# Patient Record
Sex: MALE | Race: WHITE | NOT HISPANIC OR LATINO | Employment: FULL TIME | URBAN - METROPOLITAN AREA
[De-identification: names, ages, dates, MRNs, and addresses within clinical notes are randomized per-mention and may not be internally consistent; named-entity substitution may affect disease eponyms.]

---

## 2022-06-24 ENCOUNTER — OFFICE VISIT (OUTPATIENT)
Dept: FAMILY MEDICINE | Facility: CLINIC | Age: 37
End: 2022-06-24
Payer: COMMERCIAL

## 2022-06-24 VITALS
SYSTOLIC BLOOD PRESSURE: 142 MMHG | DIASTOLIC BLOOD PRESSURE: 90 MMHG | HEART RATE: 92 BPM | TEMPERATURE: 98.1 F | OXYGEN SATURATION: 98 %

## 2022-06-24 DIAGNOSIS — J02.9 ACUTE SORE THROAT: Primary | ICD-10-CM

## 2022-06-24 DIAGNOSIS — J03.90 ACUTE TONSILLITIS, UNSPECIFIED ETIOLOGY: ICD-10-CM

## 2022-06-24 LAB
DEPRECATED S PYO AG THROAT QL EIA: NEGATIVE
GROUP A STREP BY PCR: NOT DETECTED
MONOCYTES NFR BLD AUTO: NEGATIVE %

## 2022-06-24 PROCEDURE — 86308 HETEROPHILE ANTIBODY SCREEN: CPT

## 2022-06-24 PROCEDURE — 87651 STREP A DNA AMP PROBE: CPT | Performed by: PHYSICIAN ASSISTANT

## 2022-06-24 PROCEDURE — 99203 OFFICE O/P NEW LOW 30 MIN: CPT

## 2022-06-24 PROCEDURE — 36415 COLL VENOUS BLD VENIPUNCTURE: CPT

## 2022-06-24 RX ORDER — LISINOPRIL 5 MG/1
5 TABLET ORAL DAILY
COMMUNITY
Start: 2022-04-24

## 2022-06-24 RX ORDER — AMOXICILLIN 875 MG
875 TABLET ORAL 2 TIMES DAILY
Qty: 20 TABLET | Refills: 0 | Status: SHIPPED | OUTPATIENT
Start: 2022-06-24 | End: 2022-07-04

## 2022-06-24 RX ORDER — INDOMETHACIN 50 MG/1
CAPSULE ORAL
COMMUNITY
Start: 2021-12-28

## 2022-06-24 RX ORDER — DIPHENOXYLATE HYDROCHLORIDE AND ATROPINE SULFATE 2.5; .025 MG/1; MG/1
1 TABLET ORAL
COMMUNITY

## 2022-06-24 NOTE — PROGRESS NOTES
Chief Complaint   Patient presents with     Urgent Care     Pharyngitis     2 days ago sx started with sore throat, fatigue, hurts to swallow.        ASSESSMENT/PLAN:  Cory was seen today for urgent care and pharyngitis.    Diagnoses and all orders for this visit:    Acute sore throat  -     Streptococcus A Rapid Scr w Reflx to PCR  -     Mononucleosis screen; Future  -     Mononucleosis screen    Acute tonsillitis, unspecified etiology      Rapid strep negative.  Monospot negative.  Given the secondary sickening and asymmetry there is enough concern for bacterial cause such as fusobacterium necrophorum which is a major cause of peritonsillar abscess and Lemierre's disease.  Therefore I want to cover patient with antibiotics in order to reduce risk of worsening disease and complications of peritonsillar abscess or Lemierre's.  We discussed these medications administration and side effects.  I also advised patient if they are not improving within the next 24-48 hours to return to clinic.  If they are experiencing any new or worsening shortness of breath or difficulty breathing they need to go the emergency room right away.      Francisco Wren PA-C      SUBJECTIVE:  Cory is a 37 year old male who presents to urgent care with ongoing sore throat.  About a week ago he initially had sore throat and malaise and this did improve for a few days until 2 days ago when his sore throat significantly worsened. it's only on the left side.  Pain is worse with swallowing.  He also has some myalgias and fatigue and some general malaise.  On and off headache.  No ear pain, nasal congestion runny nose, cough, wheeze or chest pain, nausea, vomiting, diarrhea or abdominal pain.  No rash.  He did take a COVID test yesterday that was negative.  His wife did have similar symptoms about a week ago and she also tested negative for COVID and improved.  ROS: Pertinent ROS neg other than the symptoms noted above in the HPI.      OBJECTIVE:  BP (!) 142/90   Pulse 92   Temp 98.1  F (36.7  C) (Tympanic)   SpO2 98%    GENERAL: healthy, alert and no distress  EYES: Eyes grossly normal to inspection, PERRL and conjunctivae and sclerae normal  HENT: Left-sided posterior oropharynx erythema with tonsil 1+ with exudate.  Right tonsil erythematous with less exudate and 0+.  No obvious abscess noted  NECK: no adenopathy, nontender    DIAGNOSTICS    No results found for any visits on 06/24/22.     Current Outpatient Medications   Medication     lisinopril (ZESTRIL) 5 MG tablet     Multiple Vitamin (MULTI-VITAMINS) TABS     indomethacin (INDOCIN) 50 MG capsule     No current facility-administered medications for this visit.      Patient Active Problem List   Diagnosis     CARDIOVASCULAR SCREENING; LDL GOAL LESS THAN 130      No past medical history on file.  No past surgical history on file.  Family History   Problem Relation Age of Onset     Coronary Artery Disease Father      Hypertension Father      Coronary Artery Disease Brother      Hypertension Brother      Social History     Tobacco Use     Smoking status: Never Smoker     Smokeless tobacco: Never Used   Substance Use Topics     Alcohol use: Yes     Comment: occ              The plan of care was discussed with the patient. They understand and agree with the course of treatment prescribed. A printed summary was given including instructions and medications.  The use of Dragon/[employer] dictation services may have been used to construct the content in this note; any grammatical or spelling errors are non-intentional. Please contact the author of this note directly if you are in need of any clarification.

## 2022-10-22 ENCOUNTER — HEALTH MAINTENANCE LETTER (OUTPATIENT)
Age: 37
End: 2022-10-22

## 2023-11-04 ENCOUNTER — HEALTH MAINTENANCE LETTER (OUTPATIENT)
Age: 38
End: 2023-11-04

## 2024-12-22 ENCOUNTER — HEALTH MAINTENANCE LETTER (OUTPATIENT)
Age: 39
End: 2024-12-22